# Patient Record
Sex: MALE | Race: BLACK OR AFRICAN AMERICAN | NOT HISPANIC OR LATINO | RURAL
[De-identification: names, ages, dates, MRNs, and addresses within clinical notes are randomized per-mention and may not be internally consistent; named-entity substitution may affect disease eponyms.]

---

## 2018-04-16 ENCOUNTER — PREPPED CHART (OUTPATIENT)
Dept: RURAL CLINIC 1 | Facility: CLINIC | Age: 58
End: 2018-04-16

## 2020-06-22 NOTE — PATIENT DISCUSSION
Glasses Rx given. PT felt his symptoms dissipated with TF rx. Retro-orbital pressure likely sinus related. Pt to consult with ENT next.

## 2022-07-01 ENCOUNTER — NEW PATIENT (OUTPATIENT)
Dept: RURAL CLINIC 1 | Facility: CLINIC | Age: 62
End: 2022-07-01

## 2022-07-01 DIAGNOSIS — H52.13: ICD-10-CM

## 2022-07-01 DIAGNOSIS — H52.4: ICD-10-CM

## 2022-07-01 PROCEDURE — 92004 COMPRE OPH EXAM NEW PT 1/>: CPT

## 2022-07-01 PROCEDURE — 92015 DETERMINE REFRACTIVE STATE: CPT

## 2022-07-01 ASSESSMENT — VISUAL ACUITY
OD_CC: 20/25
OU_CC: 20/20
OS_CC: 20/20-1

## 2022-07-01 ASSESSMENT — TONOMETRY
OD_IOP_MMHG: 17
OS_IOP_MMHG: 17

## 2023-09-25 ENCOUNTER — COMPREHENSIVE EXAM (OUTPATIENT)
Dept: RURAL CLINIC 1 | Facility: CLINIC | Age: 63
End: 2023-09-25

## 2023-09-25 DIAGNOSIS — H52.13: ICD-10-CM

## 2023-09-25 DIAGNOSIS — H25.9: ICD-10-CM

## 2023-09-25 DIAGNOSIS — H52.4: ICD-10-CM

## 2023-09-25 PROCEDURE — 92015 DETERMINE REFRACTIVE STATE: CPT

## 2023-09-25 PROCEDURE — 92014 COMPRE OPH EXAM EST PT 1/>: CPT

## 2023-09-25 ASSESSMENT — TONOMETRY
OS_IOP_MMHG: 16
OD_IOP_MMHG: 16

## 2023-09-25 ASSESSMENT — VISUAL ACUITY
OD_CC: 20/25
OS_CC: 20/20+0

## 2024-11-19 ENCOUNTER — COMPREHENSIVE EXAM (OUTPATIENT)
Dept: RURAL CLINIC 1 | Facility: CLINIC | Age: 64
End: 2024-11-19

## 2024-11-19 DIAGNOSIS — H25.9: ICD-10-CM

## 2024-11-19 DIAGNOSIS — H52.13: ICD-10-CM

## 2024-11-19 DIAGNOSIS — H52.4: ICD-10-CM

## 2024-11-19 PROCEDURE — 92015 DETERMINE REFRACTIVE STATE: CPT

## 2024-11-19 PROCEDURE — 92014 COMPRE OPH EXAM EST PT 1/>: CPT
